# Patient Record
Sex: FEMALE | URBAN - METROPOLITAN AREA
[De-identification: names, ages, dates, MRNs, and addresses within clinical notes are randomized per-mention and may not be internally consistent; named-entity substitution may affect disease eponyms.]

---

## 2017-07-10 ENCOUNTER — IMPORTED ENCOUNTER (OUTPATIENT)
Dept: URBAN - METROPOLITAN AREA CLINIC 1 | Facility: CLINIC | Age: 27
End: 2017-07-10

## 2017-07-10 PROBLEM — H52.13: Noted: 2017-07-10

## 2017-07-10 PROCEDURE — S0621 ROUTINE OPHTHALMOLOGICAL EXA: HCPCS

## 2017-07-10 NOTE — PATIENT DISCUSSION
1. Myopia: Rx was given for correction if indicated and requested. 2. COAG Suspect OU (CD 0.70/065) - IOP stableReturn for an appointment in 1 year 40/cc with Dr. Ameena Rider.

## 2017-09-12 ENCOUNTER — IMPORTED ENCOUNTER (OUTPATIENT)
Dept: URBAN - METROPOLITAN AREA CLINIC 1 | Facility: CLINIC | Age: 27
End: 2017-09-12

## 2017-09-12 PROBLEM — H04.123: Noted: 2017-09-12

## 2017-09-12 PROBLEM — H10.45: Noted: 2017-09-12

## 2017-09-12 PROCEDURE — 92012 INTRM OPH EXAM EST PATIENT: CPT

## 2017-09-12 NOTE — PATIENT DISCUSSION
1.  Dry Eyes OU -- Recommended to patient to use Artificial Tears BID OU. Begin Lotemax TID OD only (sample given use until gone). Recommend no CTL wear x 3-5 days. Patient states she would use CTL solution and would clean daily CTLs more than one day at a time. Advised no eye rubbing. 2.  Allergic Conjunctivitis OU :  Condition discussed with patient. Advised patient to use Pazeo QAM OU (sample given use until gone). Return for an appointment in 1 week 10 with Dr. Rhonda Alex.

## 2022-04-02 ASSESSMENT — TONOMETRY
OS_IOP_MMHG: 11
OS_IOP_MMHG: 11
OD_IOP_MMHG: 12
OD_IOP_MMHG: 11

## 2022-04-02 ASSESSMENT — VISUAL ACUITY
OD_SC: 20/20
OS_SC: 20/20
OD_SC: 20/20
OS_SC: 20/20